# Patient Record
Sex: MALE | Race: WHITE | NOT HISPANIC OR LATINO | Employment: OTHER | URBAN - METROPOLITAN AREA
[De-identification: names, ages, dates, MRNs, and addresses within clinical notes are randomized per-mention and may not be internally consistent; named-entity substitution may affect disease eponyms.]

---

## 2021-03-31 ENCOUNTER — TELEPHONE (OUTPATIENT)
Dept: GASTROENTEROLOGY | Facility: CLINIC | Age: 61
End: 2021-03-31

## 2021-03-31 NOTE — TELEPHONE ENCOUNTER
Called patient, was unable to leave a voicemail. Im mailing him out the open access form he will fill out & send back in.

## 2021-04-07 ENCOUNTER — OFFICE VISIT (OUTPATIENT)
Dept: GASTROENTEROLOGY | Facility: CLINIC | Age: 61
End: 2021-04-07

## 2021-04-07 ENCOUNTER — TELEPHONE (OUTPATIENT)
Dept: GASTROENTEROLOGY | Facility: CLINIC | Age: 61
End: 2021-04-07

## 2021-04-07 VITALS
RESPIRATION RATE: 16 BRPM | OXYGEN SATURATION: 98 % | WEIGHT: 198 LBS | SYSTOLIC BLOOD PRESSURE: 120 MMHG | HEIGHT: 69 IN | TEMPERATURE: 97.6 F | BODY MASS INDEX: 29.33 KG/M2 | HEART RATE: 94 BPM | DIASTOLIC BLOOD PRESSURE: 82 MMHG

## 2021-04-07 DIAGNOSIS — Z80.0 FH: COLON CANCER: ICD-10-CM

## 2021-04-07 DIAGNOSIS — K22.70 BARRETT'S ESOPHAGUS WITHOUT DYSPLASIA: Primary | ICD-10-CM

## 2021-04-07 PROCEDURE — 99203 OFFICE O/P NEW LOW 30 MIN: CPT | Performed by: INTERNAL MEDICINE

## 2021-04-07 RX ORDER — AMLODIPINE BESYLATE 5 MG/1
TABLET ORAL
COMMUNITY
Start: 2021-03-17

## 2021-04-07 NOTE — PROGRESS NOTES
Chief Complaint   Patient presents with   • GI Problem     EGD procedure needed       History of Present Illness:   60 y.o. male with a history of short segment Santana's esophagus and whose dad had colon cancer.  He last had an EGD and a colonoscopy in 5 of 2016. He has occasional heartburn and minor throat irritation. Occasional solids dysphagia. NO nausea or vomiting. No fevers, chills. He exercises. He is losing weight because of dieting and exericising. No diarrhea or constiation. No rectal bleeding or melena. No abdominal or chest pain. Nonsmoker. +ETOH: averages 20 beers/week.     Past Medical History:   Diagnosis Date   • Acid reflux    • Santana esophagus    • GERD (gastroesophageal reflux disease)    • Hypercholesteremia    • Stress        Past Surgical History:   Procedure Laterality Date   • COLONOSCOPY      2011   • COLONOSCOPY N/A 5/3/2016    Procedure: COLONOSCOPY INTO T.I.;  Surgeon: Manjinder Montenegro MD;  Location: [facility] ENDOSCOPY;  Service:    • ENDOSCOPY N/A 5/3/2016    Procedure: ESOPHAGOGASTRODUODENOSCOPY WITH COLD BIOPSIES;  Surgeon: Manjinder Montenegro MD;  Location: [facility] ENDOSCOPY;  Service:    • HEMORRHOIDECTOMY           Current Outpatient Medications:   •  amLODIPine (NORVASC) 5 MG tablet, , Disp: , Rfl:   •  atorvastatin (LIPITOR) 10 MG tablet, Take  by mouth., Disp: , Rfl:   •  Multiple Vitamins-Minerals (MULTI COMPLETE PO), Take  by mouth., Disp: , Rfl:   •  naproxen sodium (ALEVE) 220 MG tablet, Take 220 mg by mouth 2 (two) times a day as needed for mild pain (1-3)., Disp: , Rfl:   •  Omeprazole (PRILOSEC PO), Take 20 mg by mouth as needed., Disp: , Rfl:   •  VITAMIN D PO, Take  by mouth., Disp: , Rfl:   •  aspirin 325 MG tablet, Take 325 mg by mouth daily., Disp: , Rfl:     Allergies   Allergen Reactions   • Ciprofloxacin Itching and Other (See Comments)     Shaky and headache   • Sulfa Antibiotics Itching     Flushed, headache   • Penicillins        Family History   Problem Relation Age of  Onset   • Colon cancer Father        Social History     Socioeconomic History   • Marital status:      Spouse name: Not on file   • Number of children: Not on file   • Years of education: Not on file   • Highest education level: Not on file   Tobacco Use   • Smoking status: Former Smoker   • Smokeless tobacco: Current User     Types: Snuff   Substance and Sexual Activity   • Alcohol use: Yes     Comment: 5-6 days a week   • Drug use: No       Review of Systems   Gastrointestinal: Negative for abdominal pain.     Pertinent positives and negatives documented in the HPI and all other systems reviewed and were found to be negative.  Vitals:    04/07/21 1015   BP: 120/82   Pulse: 94   Resp: 16   Temp: 97.6 °F (36.4 °C)   SpO2: 98%       Physical Exam  Vitals reviewed.   Constitutional:       General: He is not in acute distress.     Appearance: Normal appearance. He is well-developed. He is not diaphoretic.   HENT:      Head: Normocephalic and atraumatic. Hair is normal.      Right Ear: Hearing, tympanic membrane, ear canal and external ear normal.      Left Ear: Hearing, tympanic membrane, ear canal and external ear normal.      Nose: Nose normal. No nasal deformity.      Mouth/Throat:      Mouth: Mucous membranes are moist. No oral lesions.      Pharynx: Uvula midline. No uvula swelling.   Eyes:      General: Lids are normal. No scleral icterus.        Right eye: No discharge.         Left eye: No discharge.      Extraocular Movements: Extraocular movements intact.      Right eye: Normal extraocular motion and no nystagmus.      Left eye: Normal extraocular motion and no nystagmus.      Conjunctiva/sclera: Conjunctivae normal.      Pupils: Pupils are equal, round, and reactive to light.   Neck:      Thyroid: No thyromegaly.      Vascular: No JVD.   Cardiovascular:      Rate and Rhythm: Normal rate and regular rhythm.      Pulses: Normal pulses.      Heart sounds: Normal heart sounds. No murmur heard.   No  gallop.    Pulmonary:      Effort: Pulmonary effort is normal. No respiratory distress.      Breath sounds: Normal breath sounds. No wheezing or rales.   Chest:      Chest wall: No tenderness.   Abdominal:      General: Bowel sounds are normal. There is no distension.      Palpations: Abdomen is soft. There is no mass.      Tenderness: There is no abdominal tenderness. There is no guarding.      Hernia: No hernia is present.   Musculoskeletal:         General: No tenderness or deformity. Normal range of motion.      Cervical back: Normal range of motion and neck supple.   Lymphadenopathy:      Cervical: No cervical adenopathy.   Skin:     General: Skin is warm and dry.      Findings: No rash.   Neurological:      Mental Status: He is alert and oriented to person, place, and time.      Cranial Nerves: No cranial nerve deficit.      Motor: No abnormal muscle tone.      Coordination: Coordination normal.      Deep Tendon Reflexes: Reflexes are normal and symmetric. Reflexes normal.   Psychiatric:         Mood and Affect: Mood normal.         Behavior: Behavior normal.         Thought Content: Thought content normal.         Judgment: Judgment normal.         Diagnoses and all orders for this visit:    1. Santana's esophagus without dysplasia (Primary)  -     Case Request; Standing  -     Case Request    2. FH: colon cancer  -     Case Request; Standing  -     Case Request    Other orders  -     Follow Anesthesia Guidelines / Standing Orders; Future  -     Obtain Informed Consent; Future  -     Implement Anesthesia orders day of procedure.; Standing  -     Obtain informed consent; Standing  -     Verify bowel prep was successful; Standing  -     Give tap water enema if bowel prep was insufficient; Standing      Assessment:  1.  History of short segment Santana's esophagus.  2.  Family history (dad) of colon cancer.  3. He probably drinks too much beer.  4.     Recommendations:  1. EGD and colonoscopy    No follow-ups on  file.    Manjinder Montenegro MD  4/7/2021

## 2021-04-07 NOTE — TELEPHONE ENCOUNTER
spoke with pt in office scheduled at [facility] on june 22 arrive at 1210 pm tamar del [facility]--faustino

## 2021-06-07 ENCOUNTER — TRANSCRIBE ORDERS (OUTPATIENT)
Dept: SLEEP MEDICINE | Facility: HOSPITAL | Age: 61
End: 2021-06-07

## 2021-06-07 DIAGNOSIS — Z01.818 OTHER SPECIFIED PRE-OPERATIVE EXAMINATION: Primary | ICD-10-CM

## 2021-06-19 ENCOUNTER — LAB (OUTPATIENT)
Dept: LAB | Facility: HOSPITAL | Age: 61
End: 2021-06-19

## 2021-06-19 DIAGNOSIS — Z01.818 OTHER SPECIFIED PRE-OPERATIVE EXAMINATION: ICD-10-CM

## 2021-06-19 LAB — SARS-COV-2 ORF1AB RESP QL NAA+PROBE: NOT DETECTED

## 2021-06-19 PROCEDURE — C9803 HOPD COVID-19 SPEC COLLECT: HCPCS

## 2021-06-19 PROCEDURE — U0004 COV-19 TEST NON-CDC HGH THRU: HCPCS

## 2021-06-22 ENCOUNTER — ANESTHESIA (OUTPATIENT)
Dept: GASTROENTEROLOGY | Facility: HOSPITAL | Age: 61
End: 2021-06-22

## 2021-06-22 ENCOUNTER — ANESTHESIA EVENT (OUTPATIENT)
Dept: GASTROENTEROLOGY | Facility: HOSPITAL | Age: 61
End: 2021-06-22

## 2021-06-22 ENCOUNTER — HOSPITAL ENCOUNTER (OUTPATIENT)
Facility: HOSPITAL | Age: 61
Setting detail: HOSPITAL OUTPATIENT SURGERY
Discharge: HOME | End: 2021-06-22
Attending: INTERNAL MEDICINE | Admitting: INTERNAL MEDICINE

## 2021-06-22 VITALS
OXYGEN SATURATION: 100 % | DIASTOLIC BLOOD PRESSURE: 100 MMHG | BODY MASS INDEX: 28.93 KG/M2 | WEIGHT: 195.3 LBS | HEIGHT: 69 IN | SYSTOLIC BLOOD PRESSURE: 137 MMHG | RESPIRATION RATE: 16 BRPM | HEART RATE: 81 BPM | TEMPERATURE: 98.2 F

## 2021-06-22 DIAGNOSIS — K22.70 BARRETT'S ESOPHAGUS WITHOUT DYSPLASIA: ICD-10-CM

## 2021-06-22 DIAGNOSIS — Z80.0 FH: COLON CANCER: ICD-10-CM

## 2021-06-22 PROCEDURE — 25010000002 PROPOFOL 10 MG/ML EMULSION: Performed by: ANESTHESIOLOGY

## 2021-06-22 PROCEDURE — 43239 EGD BIOPSY SINGLE/MULTIPLE: CPT | Performed by: INTERNAL MEDICINE

## 2021-06-22 PROCEDURE — 88305 TISSUE EXAM BY PATHOLOGIST: CPT | Performed by: INTERNAL MEDICINE

## 2021-06-22 PROCEDURE — 45380 COLONOSCOPY AND BIOPSY: CPT | Performed by: INTERNAL MEDICINE

## 2021-06-22 RX ORDER — PROPOFOL 10 MG/ML
VIAL (ML) INTRAVENOUS CONTINUOUS PRN
Status: DISCONTINUED | OUTPATIENT
Start: 2021-06-22 | End: 2021-06-22 | Stop reason: SURG

## 2021-06-22 RX ORDER — LIDOCAINE HYDROCHLORIDE 20 MG/ML
INJECTION, SOLUTION INFILTRATION; PERINEURAL AS NEEDED
Status: DISCONTINUED | OUTPATIENT
Start: 2021-06-22 | End: 2021-06-22 | Stop reason: SURG

## 2021-06-22 RX ORDER — SODIUM CHLORIDE 0.9 % (FLUSH) 0.9 %
3 SYRINGE (ML) INJECTION EVERY 12 HOURS SCHEDULED
Status: DISCONTINUED | OUTPATIENT
Start: 2021-06-22 | End: 2021-06-22 | Stop reason: HOSPADM

## 2021-06-22 RX ORDER — SODIUM CHLORIDE, SODIUM LACTATE, POTASSIUM CHLORIDE, CALCIUM CHLORIDE 600; 310; 30; 20 MG/100ML; MG/100ML; MG/100ML; MG/100ML
30 INJECTION, SOLUTION INTRAVENOUS CONTINUOUS PRN
Status: DISCONTINUED | OUTPATIENT
Start: 2021-06-22 | End: 2021-06-22 | Stop reason: HOSPADM

## 2021-06-22 RX ORDER — PROPOFOL 10 MG/ML
VIAL (ML) INTRAVENOUS AS NEEDED
Status: DISCONTINUED | OUTPATIENT
Start: 2021-06-22 | End: 2021-06-22 | Stop reason: SURG

## 2021-06-22 RX ORDER — SODIUM CHLORIDE 0.9 % (FLUSH) 0.9 %
10 SYRINGE (ML) INJECTION AS NEEDED
Status: DISCONTINUED | OUTPATIENT
Start: 2021-06-22 | End: 2021-06-22 | Stop reason: HOSPADM

## 2021-06-22 RX ADMIN — LIDOCAINE HYDROCHLORIDE 60 MG: 20 INJECTION, SOLUTION INFILTRATION; PERINEURAL at 13:13

## 2021-06-22 RX ADMIN — PROPOFOL 300 MCG/KG/MIN: 10 INJECTION, EMULSION INTRAVENOUS at 13:16

## 2021-06-22 RX ADMIN — PROPOFOL 100 MG: 10 INJECTION, EMULSION INTRAVENOUS at 13:16

## 2021-06-22 RX ADMIN — SODIUM CHLORIDE, POTASSIUM CHLORIDE, SODIUM LACTATE AND CALCIUM CHLORIDE 30 ML/HR: 600; 310; 30; 20 INJECTION, SOLUTION INTRAVENOUS at 12:49

## 2021-06-22 NOTE — ANESTHESIA PREPROCEDURE EVALUATION
Anesthesia Evaluation     Patient summary reviewed and Nursing notes reviewed   no history of anesthetic complications:  NPO Solid Status: > 8 hours             Airway   Mallampati: III  Dental      Pulmonary - negative pulmonary ROS    breath sounds clear to auscultation  Cardiovascular     Rhythm: regular  Rate: normal    (+) hyperlipidemia,   (-) murmur    ROS comment: Elevated cholesterol    Neuro/Psych- negative ROS  GI/Hepatic/Renal/Endo    (+)  GERD,      Musculoskeletal     (+) arthralgias,   Abdominal    Substance History      OB/GYN          Other                          Anesthesia Plan    ASA 2     MAC       Anesthetic plan, all risks, benefits, and alternatives have been provided, discussed and informed consent has been obtained with: patient.

## 2021-06-22 NOTE — ANESTHESIA POSTPROCEDURE EVALUATION
Patient: Trey Pratt    Procedure Summary     Date: 06/22/21 Room / Location:  [facility] /  [facility]    Anesthesia Start: 1312 Anesthesia Stop: 1348    Procedures:       ESOPHAGOGASTRODUODENOSCOPY WITH BIOPSIES (N/A Esophagus)      COLONOSCOPY TO CECUM WITH COLD BIOPSY POLYPECTOMY (N/A ) Diagnosis:       Santana's esophagus without dysplasia      FH: colon cancer      (Santana's esophagus without dysplasia [K22.70])      (FH: colon cancer [Z80.0])    Surgeons: Manjinder Montenegro MD Provider: Jason Solis MD    Anesthesia Type: MAC ASA Status: 2          Anesthesia Type: MAC    Vitals  Vitals Value Taken Time   /67 06/22/21 1345   Temp     Pulse 86 06/22/21 1345   Resp 16 06/22/21 1345   SpO2 97 % 06/22/21 1345           Post Anesthesia Care and Evaluation    Patient location during evaluation: bedside  Patient participation: complete - patient participated  Level of consciousness: awake  Pain management: adequate  Airway patency: patent  Anesthetic complications: No anesthetic complications  PONV Status: controlled  Cardiovascular status: acceptable  Respiratory status: acceptable  Hydration status: acceptable    Comments: --------------------            06/22/21               1345     --------------------   BP:       104/67     Pulse:      86       Resp:       16       Temp:                SpO2:      97%      --------------------

## 2021-06-22 NOTE — H&P
Chief Complaint   Patient presents with   • GI Problem       EGD procedure needed         History of Present Illness:   60 y.o. male with a history of short segment Santana's esophagus and whose dad had colon cancer.  He last had an EGD and a colonoscopy in 5 of 2016. He has occasional heartburn and minor throat irritation. Occasional solids dysphagia. NO nausea or vomiting. No fevers, chills. He exercises. He is losing weight because of dieting and exericising. No diarrhea or constiation. No rectal bleeding or melena. No abdominal or chest pain. Nonsmoker. +ETOH: averages 20 beers/week.      Medical History        Past Medical History:   Diagnosis Date   • Acid reflux     • Santana esophagus     • GERD (gastroesophageal reflux disease)     • Hypercholesteremia     • Stress              Surgical History         Past Surgical History:   Procedure Laterality Date   • COLONOSCOPY         2011   • COLONOSCOPY N/A 5/3/2016     Procedure: COLONOSCOPY INTO T.I.;  Surgeon: Manjinder Montenegro MD;  Location: [facility] ENDOSCOPY;  Service:    • ENDOSCOPY N/A 5/3/2016     Procedure: ESOPHAGOGASTRODUODENOSCOPY WITH COLD BIOPSIES;  Surgeon: Manjinder Montenegro MD;  Location: [facility] ENDOSCOPY;  Service:    • HEMORRHOIDECTOMY                   Current Outpatient Medications:   •  amLODIPine (NORVASC) 5 MG tablet, , Disp: , Rfl:   •  atorvastatin (LIPITOR) 10 MG tablet, Take  by mouth., Disp: , Rfl:   •  Multiple Vitamins-Minerals (MULTI COMPLETE PO), Take  by mouth., Disp: , Rfl:   •  naproxen sodium (ALEVE) 220 MG tablet, Take 220 mg by mouth 2 (two) times a day as needed for mild pain (1-3)., Disp: , Rfl:   •  Omeprazole (PRILOSEC PO), Take 20 mg by mouth as needed., Disp: , Rfl:   •  VITAMIN D PO, Take  by mouth., Disp: , Rfl:   •  aspirin 325 MG tablet, Take 325 mg by mouth daily., Disp: , Rfl:            Allergies   Allergen Reactions   • Ciprofloxacin Itching and Other (See Comments)       Shaky and headache   • Sulfa Antibiotics Itching        Flushed, headache   • Penicillins                 Family History   Problem Relation Age of Onset   • Colon cancer Father           Social History   Social History            Socioeconomic History   • Marital status:        Spouse name: Not on file   • Number of children: Not on file   • Years of education: Not on file   • Highest education level: Not on file   Tobacco Use   • Smoking status: Former Smoker   • Smokeless tobacco: Current User       Types: Snuff   Substance and Sexual Activity   • Alcohol use: Yes       Comment: 5-6 days a week   • Drug use: No            Review of Systems   Gastrointestinal: Negative for abdominal pain.      Pertinent positives and negatives documented in the HPI and all other systems reviewed and were found to be negative.      Vitals:     04/07/21 1015   BP: 120/82   Pulse: 94   Resp: 16   Temp: 97.6 °F (36.4 °C)   SpO2: 98%         Physical Exam  Vitals reviewed.   Constitutional:       General: He is not in acute distress.     Appearance: Normal appearance. He is well-developed. He is not diaphoretic.   HENT:      Head: Normocephalic and atraumatic. Hair is normal.      Right Ear: Hearing, tympanic membrane, ear canal and external ear normal.      Left Ear: Hearing, tympanic membrane, ear canal and external ear normal.      Nose: Nose normal. No nasal deformity.      Mouth/Throat:      Mouth: Mucous membranes are moist. No oral lesions.      Pharynx: Uvula midline. No uvula swelling.   Eyes:      General: Lids are normal. No scleral icterus.        Right eye: No discharge.         Left eye: No discharge.      Extraocular Movements: Extraocular movements intact.      Right eye: Normal extraocular motion and no nystagmus.      Left eye: Normal extraocular motion and no nystagmus.      Conjunctiva/sclera: Conjunctivae normal.      Pupils: Pupils are equal, round, and reactive to light.   Neck:      Thyroid: No thyromegaly.      Vascular: No JVD.   Cardiovascular:      Rate  and Rhythm: Normal rate and regular rhythm.      Pulses: Normal pulses.      Heart sounds: Normal heart sounds. No murmur heard.   No gallop.    Pulmonary:      Effort: Pulmonary effort is normal. No respiratory distress.      Breath sounds: Normal breath sounds. No wheezing or rales.   Chest:      Chest wall: No tenderness.   Abdominal:      General: Bowel sounds are normal. There is no distension.      Palpations: Abdomen is soft. There is no mass.      Tenderness: There is no abdominal tenderness. There is no guarding.      Hernia: No hernia is present.   Musculoskeletal:         General: No tenderness or deformity. Normal range of motion.      Cervical back: Normal range of motion and neck supple.   Lymphadenopathy:      Cervical: No cervical adenopathy.   Skin:     General: Skin is warm and dry.      Findings: No rash.   Neurological:      Mental Status: He is alert and oriented to person, place, and time.      Cranial Nerves: No cranial nerve deficit.      Motor: No abnormal muscle tone.      Coordination: Coordination normal.      Deep Tendon Reflexes: Reflexes are normal and symmetric. Reflexes normal.   Psychiatric:         Mood and Affect: Mood normal.         Behavior: Behavior normal.         Thought Content: Thought content normal.         Judgment: Judgment normal.            Diagnoses and all orders for this visit:     1. Santana's esophagus without dysplasia (Primary)  -     Case Request; Standing  -     Case Request     2. FH: colon cancer  -     Case Request; Standing  -     Case Request     Other orders  -     Follow Anesthesia Guidelines / Standing Orders; Future  -     Obtain Informed Consent; Future  -     Implement Anesthesia orders day of procedure.; Standing  -     Obtain informed consent; Standing  -     Verify bowel prep was successful; Standing  -     Give tap water enema if bowel prep was insufficient; Standing        Assessment:  1.  History of short segment Santana's esophagus.  2.   Family history (dad) of colon cancer.  3. He probably drinks too much beer.  4.      Recommendations:  1. EGD and colonoscopy     No follow-ups on file.     6/22/21 - No change from the above H and P.   Manjinder Montenegro MD

## 2021-06-23 LAB
LAB AP CASE REPORT: NORMAL
PATH REPORT.FINAL DX SPEC: NORMAL
PATH REPORT.GROSS SPEC: NORMAL

## 2021-06-25 NOTE — PROGRESS NOTES
06/25/21  Please tell him that pathology from the EGD did not show evidence of Santana's esophagus, which is good.  I would recommend that he have a repeat EGD in 3 to 5 years because of his previous history of Santana's esophagus.       The colon polyp that we removed was not cancerous and not precancerous, which is good.  I recommend a repeat colonoscopy in 5 years.       Please fax a copy of this report to his PCP.  Sherman. [email or web address]

## 2021-07-14 ENCOUNTER — TELEPHONE (OUTPATIENT)
Dept: GASTROENTEROLOGY | Facility: CLINIC | Age: 61
End: 2021-07-14

## 2021-07-14 NOTE — TELEPHONE ENCOUNTER
Called pt and advised of Dr Montenegro's note. Verb understanding.     Egd placed in recall and hm for 06/22/2024 and c/s and hm for 06/22/2026.    [facility]

## 2021-07-14 NOTE — TELEPHONE ENCOUNTER
----- Message from Manjinder Montenegro MD sent at 6/25/2021  7:10 AM EDT -----  06/25/21  Please tell him that pathology from the EGD did not show evidence of Santana's esophagus, which is good.  I would recommend that he have a repeat EGD in 3 to 5 years because of his previous history of Santana's esophagus.       The colon polyp that we removed was not cancerous and not precancerous, which is good.  I recommend a repeat colonoscopy in 5 years.       Please fax a copy of this report to his PCP.  Frank bennett

## 2024-04-01 ENCOUNTER — PREP FOR SURGERY (OUTPATIENT)
Dept: OTHER | Facility: HOSPITAL | Age: 64
End: 2024-04-01
Payer: COMMERCIAL

## 2024-04-01 ENCOUNTER — TELEPHONE (OUTPATIENT)
Dept: GASTROENTEROLOGY | Facility: CLINIC | Age: 64
End: 2024-04-01
Payer: COMMERCIAL

## 2024-04-01 DIAGNOSIS — K63.5 COLON POLYP: ICD-10-CM

## 2024-04-01 DIAGNOSIS — Z80.0 FH: COLON CANCER: Primary | ICD-10-CM

## 2024-04-01 NOTE — TELEPHONE ENCOUNTER
LAST C/S   6/22/21  IN EPIC    PERSONAL HX OF POLYPS     FAMILY HX OF POLYPS     FAMILY HX OF COLON CA    NO ASA OR BLOOD THINNERS        LIST OF  MEDICATIONS    AMLODIPINE  ]ATORVASTATIN   OMEPRAZOLE  VITAMIN C  MULTIPLE VITAMIN   VITAMIN D3            OA QUESTIONNAIRE SCANNED IN MEDIA

## 2024-04-02 ENCOUNTER — TELEPHONE (OUTPATIENT)
Dept: GASTROENTEROLOGY | Facility: CLINIC | Age: 64
End: 2024-04-02
Payer: COMMERCIAL

## 2024-04-02 PROBLEM — K63.5 COLON POLYP: Status: ACTIVE | Noted: 2024-04-01

## 2024-04-02 NOTE — TELEPHONE ENCOUNTER
SHELL CULLEN IN SCHEDULING PT SCHEDULED 08/27/2024 ARRIVING AT 9:45AM [employer] PREP INFORMATION MAILED TO ADDRESS ON FILE VERIFIED BY THE PT.OK FOR THE HUB TO RELAY

## 2024-06-19 ENCOUNTER — TELEPHONE (OUTPATIENT)
Dept: GASTROENTEROLOGY | Facility: CLINIC | Age: 64
End: 2024-06-19
Payer: COMMERCIAL

## 2024-06-19 NOTE — TELEPHONE ENCOUNTER
CALLED TO OFFER PT EARLIER SCOPE DATES PT COULDN'T ACCEPT EITHER DATE,COMMUNICATED I WOULD KEEP HIM ON THE LIST OK FOR HUB TO RELAY

## 2024-06-24 ENCOUNTER — TELEPHONE (OUTPATIENT)
Dept: GASTROENTEROLOGY | Facility: CLINIC | Age: 64
End: 2024-06-24
Payer: COMMERCIAL

## 2024-06-24 NOTE — TELEPHONE ENCOUNTER
CALLED TO OFFER EARLIER DATE FOR PROCEDURE VERBALIZES HE WONT BE IN TOWN WILL KEEP ON THE LIST,OK FOR HUB TO RELAY

## 2024-08-22 ENCOUNTER — TELEPHONE (OUTPATIENT)
Dept: GASTROENTEROLOGY | Facility: CLINIC | Age: 64
End: 2024-08-22
Payer: COMMERCIAL

## 2024-08-22 NOTE — TELEPHONE ENCOUNTER
Caller: [facility]      Best call back number: [phone]        Who are you requesting to speak with (clinical staff, provider,  specific staff member):         What was the call regarding: MARTA FROM [facility] CALLING. UNABLE TO REACH PT TO GET DEPOSIT FOR PROC ON 27TH. IF UNABLE TO REACH PT CAN THIS PROC BE RESCHEDULED OR NO? PLEASE CONTACT MARTA TO ADVISE.    Is it okay if the provider responds through MyChart: NO

## 2024-08-22 NOTE — TELEPHONE ENCOUNTER
SPOKE WITH PT HE STATES THAT HE HAS RETURNED [facility]LE[facility] CALL FROM [facility] ESTIMATES AND MADE HIS 10 PERCENG DOWN PAYMENT

## 2024-08-26 RX ORDER — INDOMETHACIN 50 MG/1
50 CAPSULE ORAL 3 TIMES DAILY PRN
COMMUNITY

## 2024-08-26 RX ORDER — COLCHICINE 0.6 MG/1
0.6 TABLET ORAL DAILY PRN
COMMUNITY
Start: 2024-05-29

## 2024-08-26 RX ORDER — TAMSULOSIN HYDROCHLORIDE 0.4 MG/1
1 CAPSULE ORAL DAILY
COMMUNITY
Start: 2024-06-18

## 2024-08-27 ENCOUNTER — ANESTHESIA (OUTPATIENT)
Dept: GASTROENTEROLOGY | Facility: HOSPITAL | Age: 64
End: 2024-08-27
Payer: COMMERCIAL

## 2024-08-27 ENCOUNTER — HOSPITAL ENCOUNTER (OUTPATIENT)
Facility: HOSPITAL | Age: 64
Setting detail: HOSPITAL OUTPATIENT SURGERY
Discharge: HOME | End: 2024-08-27
Attending: INTERNAL MEDICINE | Admitting: INTERNAL MEDICINE
Payer: COMMERCIAL

## 2024-08-27 ENCOUNTER — ANESTHESIA EVENT (OUTPATIENT)
Dept: GASTROENTEROLOGY | Facility: HOSPITAL | Age: 64
End: 2024-08-27
Payer: COMMERCIAL

## 2024-08-27 VITALS
BODY MASS INDEX: 27.85 KG/M2 | SYSTOLIC BLOOD PRESSURE: 143 MMHG | HEART RATE: 79 BPM | WEIGHT: 188 LBS | OXYGEN SATURATION: 98 % | DIASTOLIC BLOOD PRESSURE: 98 MMHG | RESPIRATION RATE: 19 BRPM | HEIGHT: 69 IN

## 2024-08-27 DIAGNOSIS — K63.5 COLON POLYP: ICD-10-CM

## 2024-08-27 DIAGNOSIS — Z80.0 FH: COLON CANCER: ICD-10-CM

## 2024-08-27 DIAGNOSIS — Z87.19 HISTORY OF BARRETT'S ESOPHAGUS: ICD-10-CM

## 2024-08-27 PROCEDURE — 25810000003 LACTATED RINGERS PER 1000 ML: Performed by: NURSE ANESTHETIST, CERTIFIED REGISTERED

## 2024-08-27 PROCEDURE — 25010000002 PROPOFOL 200 MG/20ML EMULSION: Performed by: NURSE ANESTHETIST, CERTIFIED REGISTERED

## 2024-08-27 PROCEDURE — 88305 TISSUE EXAM BY PATHOLOGIST: CPT | Performed by: INTERNAL MEDICINE

## 2024-08-27 PROCEDURE — 25810000003 LACTATED RINGERS PER 1000 ML: Performed by: INTERNAL MEDICINE

## 2024-08-27 RX ORDER — LIDOCAINE HYDROCHLORIDE 20 MG/ML
INJECTION, SOLUTION EPIDURAL; INFILTRATION; INTRACAUDAL; PERINEURAL AS NEEDED
Status: DISCONTINUED | OUTPATIENT
Start: 2024-08-27 | End: 2024-08-27 | Stop reason: SURG

## 2024-08-27 RX ORDER — SODIUM CHLORIDE, SODIUM LACTATE, POTASSIUM CHLORIDE, CALCIUM CHLORIDE 600; 310; 30; 20 MG/100ML; MG/100ML; MG/100ML; MG/100ML
30 INJECTION, SOLUTION INTRAVENOUS CONTINUOUS PRN
Status: DISCONTINUED | OUTPATIENT
Start: 2024-08-27 | End: 2024-08-27 | Stop reason: HOSPADM

## 2024-08-27 RX ORDER — PROPOFOL 10 MG/ML
INJECTION, EMULSION INTRAVENOUS AS NEEDED
Status: DISCONTINUED | OUTPATIENT
Start: 2024-08-27 | End: 2024-08-27 | Stop reason: SURG

## 2024-08-27 RX ORDER — SODIUM CHLORIDE, SODIUM LACTATE, POTASSIUM CHLORIDE, CALCIUM CHLORIDE 600; 310; 30; 20 MG/100ML; MG/100ML; MG/100ML; MG/100ML
INJECTION, SOLUTION INTRAVENOUS CONTINUOUS PRN
Status: DISCONTINUED | OUTPATIENT
Start: 2024-08-27 | End: 2024-08-27 | Stop reason: SURG

## 2024-08-27 RX ADMIN — LIDOCAINE HYDROCHLORIDE 100 MG: 20 INJECTION, SOLUTION EPIDURAL; INFILTRATION; INTRACAUDAL; PERINEURAL at 10:59

## 2024-08-27 RX ADMIN — SODIUM CHLORIDE, POTASSIUM CHLORIDE, SODIUM LACTATE AND CALCIUM CHLORIDE 30 ML/HR: 600; 310; 30; 20 INJECTION, SOLUTION INTRAVENOUS at 10:10

## 2024-08-27 RX ADMIN — SODIUM CHLORIDE, POTASSIUM CHLORIDE, SODIUM LACTATE AND CALCIUM CHLORIDE: 600; 310; 30; 20 INJECTION, SOLUTION INTRAVENOUS at 10:54

## 2024-08-27 RX ADMIN — PROPOFOL 100 MG: 10 INJECTION, EMULSION INTRAVENOUS at 10:59

## 2024-08-27 RX ADMIN — PROPOFOL 200 MCG/KG/MIN: 10 INJECTION, EMULSION INTRAVENOUS at 11:01

## 2024-08-27 RX ADMIN — PROPOFOL 40 MG: 10 INJECTION, EMULSION INTRAVENOUS at 11:00

## 2024-08-27 NOTE — ANESTHESIA PREPROCEDURE EVALUATION
Anesthesia Evaluation     Patient summary reviewed and Nursing notes reviewed                Airway   Mallampati: III  Dental      Pulmonary    (+) a smoker Former,  Cardiovascular     Rhythm: regular  Rate: normal    (+) hypertension, hyperlipidemia      Neuro/Psych- negative ROS  GI/Hepatic/Renal/Endo    (+) GERD    Musculoskeletal (-) negative ROS    Abdominal    Substance History   (+) alcohol use     OB/GYN negative ob/gyn ROS         Other                    Anesthesia Plan    ASA 2     MAC     intravenous induction     Anesthetic plan, risks, benefits, and alternatives have been provided, discussed and informed consent has been obtained with: patient.    CODE STATUS:

## 2024-08-27 NOTE — DISCHARGE INSTRUCTIONS
For the next 24 hours patient needs to be with a responsible adult.    For THE REST OF TODAY DO NOT drive, operate machinery, appliances, drink alcohol, make important decisions or sign legal documents.    Start with a light or bland diet if you are feeling sick to your stomach otherwise advance to regular diet as tolerated.    Follow recommendations on procedure report if provided by your doctor.    Call Dr Montenegro  for problems [phone]    Problems may include but not limited to: large amounts of bleeding, trouble breathing, repeated vomiting, severe unrelieved pain, fever or chills.      If biopsies or polyps were taken, MD will call you with the results in about 7 days. If you don't hear from the MD in 2 weeks, call the number above.

## 2024-08-27 NOTE — H&P
[facility]  Pre Procedure History & Physical    Chief Complaint:   Time for my EGD and colonoscopy    Subjective     HPI:   63 y.o. male who has a history of short segment Santana's esophagus without dysplasia.  In addition his dad had colon cancer.    Past Medical History:   Past Medical History:   Diagnosis Date    Acid reflux     Santana esophagus     Enlarged prostate     GERD (gastroesophageal reflux disease)     History of COVID-19     Hypercholesteremia     Hypertension     Stress        Family History:  Family History   Problem Relation Age of Onset    Colon cancer Father     [place] Hyperthermia Neg Hx        Social History:   reports that he has quit smoking. His smokeless tobacco use includes snuff. He reports current alcohol use of about 5.0 standard drinks of alcohol per week. He reports that he does not use drugs.    Medications:   Medications Prior to Admission   Medication Sig Dispense Refill Last Dose    amLODIPine (NORVASC) 5 MG tablet Take 1 tablet by mouth Daily.   8/27/2024    atorvastatin (LIPITOR) 10 MG tablet Take 1 tablet by mouth Daily.   8/26/2024    colchicine 0.6 MG tablet Take 1 tablet by mouth Daily As Needed for Muscle / Joint Pain (GOUT SYMPTOMS).   Past Month    Multiple Vitamins-Minerals (MULTI COMPLETE PO) Take 1 tablet by mouth Daily.   Past Week    naproxen sodium (ALEVE) 220 MG tablet Take 1 tablet by mouth 2 (Two) Times a Day As Needed for Mild Pain.   Past Week    Omeprazole (PRILOSEC PO) Take 20 mg by mouth Daily.   8/26/2024    tamsulosin (FLOMAX) 0.4 MG capsule 24 hr capsule Take 1 capsule by mouth Daily.   8/26/2024    VITAMIN D PO Take 1 tablet by mouth Daily.   Past Week    indomethacin (INDOCIN) 50 MG capsule Take 1 capsule by mouth 3 (Three) Times a Day As Needed for Mild Pain (GOUT SYMPTOMS).   More than a month       Allergies:  Ciprofloxacin, Sulfa antibiotics, and Penicillins    ROS:    Pertinent items are noted in HPI     Objective  "    Blood pressure 130/87, pulse 86, resp. rate 21, height 175.3 cm (69\"), weight 85.3 kg (188 lb), SpO2 99%.    Physical Exam   Constitutional: Pt is oriented to person, place, and time and well-developed, well-nourished, and in no distress.   HENT:   Mouth/Throat: Oropharynx is clear and moist.   Neck: Normal range of motion. Neck supple.   Cardiovascular: Normal rate, regular rhythm and normal heart sounds.    Pulmonary/Chest: Effort normal and breath sounds normal. No respiratory distress. No  wheezes.   Abdominal: Soft. Bowel sounds are normal.   Skin: Skin is warm and dry.   Psychiatric: Mood, memory, affect and judgment normal.     Assessment & Plan     Diagnosis:  63 y.o. male who has a history of short segment Santana's esophagus without dysplasia.  In addition his dad had colon cancer.    Anticipated Surgical Procedure:  EGD and Colonoscopy    The risks, benefits, and alternatives of this procedure have been discussed with the patient or the responsible party- the patient understands and agrees to proceed.    Manjinder Montenegro M.D.   "

## 2024-08-27 NOTE — ANESTHESIA POSTPROCEDURE EVALUATION
Patient: Trey Pratt    Procedure Summary       Date: 08/27/24 Room / Location: [facility] ENDOSCOPY 4 / [facility]    Anesthesia Start: 1054 Anesthesia Stop: 1125    Procedures:       COLONOSCOPY INTO CECUM      ESOPHAGOGASTRODUODENOSCOPY WITH BIOPSIES (Esophagus) Diagnosis:       FH: colon cancer      Colon polyp      (FH: colon cancer [Z80.0])      (Colon polyp [K63.5])    Surgeons: Manjinder Montenegro MD Provider: Anup Yun MD    Anesthesia Type: MAC ASA Status: 2            Anesthesia Type: MAC    Vitals  Vitals Value Taken Time   /98 08/27/24 1151   Temp     Pulse 74 08/27/24 1153   Resp 19 08/27/24 1147   SpO2 98 % 08/27/24 1153   Vitals shown include unfiled device data.        Post Anesthesia Care and Evaluation    Patient location during evaluation: PACU  Patient participation: complete - patient participated  Level of consciousness: awake and alert  Pain management: adequate    Airway patency: patent  Anesthetic complications: No anesthetic complications    Cardiovascular status: acceptable  Respiratory status: acceptable  Hydration status: acceptable    Comments: --------------------            08/27/24               1147     --------------------   BP:       143/98     Pulse:      79       Resp:       19       SpO2:      98%      --------------------

## 2024-08-28 LAB
CYTO UR: NORMAL
LAB AP CASE REPORT: NORMAL
PATH REPORT.FINAL DX SPEC: NORMAL
PATH REPORT.GROSS SPEC: NORMAL

## 2024-09-09 ENCOUNTER — TELEPHONE (OUTPATIENT)
Dept: GASTROENTEROLOGY | Facility: CLINIC | Age: 64
End: 2024-09-09
Payer: COMMERCIAL

## 2024-09-09 RX ORDER — OMEPRAZOLE 40 MG/1
40 CAPSULE, DELAYED RELEASE ORAL DAILY
COMMUNITY

## 2024-09-09 NOTE — TELEPHONE ENCOUNTER
----- Message from Manjinder Montenegro sent at 9/8/2024  2:07 PM EDT -----  Regarding: FW: My Omeprozale dosage   Contact: [phone]  Please update his medication list per his latest instructions (concerning Omeprazole dose). Thx..kjh  ----- Message -----  From: Otilia Vo RN  Sent: 8/27/2024   3:58 PM EDT  To: Manjinder Montenegro MD  Subject: FW: My Omeprozale dosage                           ----- Message -----  From: Trey Pratt  Sent: 8/27/2024   3:39 PM EDT  To: [facility]  Subject: My Omeprozale dosage                             My Omeprozale dosage is actually 40mg not 20mg. please have someone update this medication please. Thank you.

## 2024-09-10 ENCOUNTER — TELEPHONE (OUTPATIENT)
Dept: GASTROENTEROLOGY | Facility: CLINIC | Age: 64
End: 2024-09-10
Payer: COMMERCIAL

## 2024-09-10 NOTE — TELEPHONE ENCOUNTER
----- Message from Manjinder Montenegro sent at 9/8/2024  9:33 PM EDT -----  09/08/24       Please tell him that pathology from his recent EGD showed benign fundic gland polyps in the stomach (which are not cancerous and not precancerous, which is good).  There is no evidence of Helicobacter pylori lining the stomach.  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  Please send a copy of this report to his PCP.  Frank bennett

## 2024-09-10 NOTE — TELEPHONE ENCOUNTER
Patient notified of results and recommendations and verbalized understanding    Egd recall placed for 8/27/27

## (undated) DEVICE — LN SMPL CO2 SHTRM SD STREAM W/M LUER

## (undated) DEVICE — TUBING, SUCTION, 1/4" X 10', STRAIGHT: Brand: MEDLINE

## (undated) DEVICE — SENSR O2 OXIMAX FNGR A/ 18IN NONSTR

## (undated) DEVICE — FRCP BX RADJAW4 NDL 2.8 240CM LG OG BX40

## (undated) DEVICE — ADAPT CLN BIOGUARD AIR/H2O DISP

## (undated) DEVICE — KT ORCA ORCAPOD DISP STRL

## (undated) DEVICE — SINGLE-USE BIOPSY FORCEPS: Brand: RADIAL JAW 4

## (undated) DEVICE — BITEBLOCK OMNI BLOC

## (undated) DEVICE — BLCK/BITE BLOX W/DENTL/RIM W/STRAP 54F

## (undated) DEVICE — MSK PROC CURAPLEX O2 2/ADAPT 7FT

## (undated) DEVICE — THE TORRENT IRRIGATION SCOPE CONNECTOR IS USED WITH THE TORRENT IRRIGATION TUBING TO PROVIDE IRRIGATION FLUIDS SUCH AS STERILE WATER DURING GASTROINTESTINAL ENDOSCOPIC PROCEDURES WHEN USED IN CONJUNCTION WITH AN IRRIGATION PUMP (OR ELECTROSURGICAL UNIT).: Brand: TORRENT